# Patient Record
Sex: FEMALE | Race: BLACK OR AFRICAN AMERICAN | NOT HISPANIC OR LATINO | Employment: FULL TIME | URBAN - METROPOLITAN AREA
[De-identification: names, ages, dates, MRNs, and addresses within clinical notes are randomized per-mention and may not be internally consistent; named-entity substitution may affect disease eponyms.]

---

## 2021-05-30 ENCOUNTER — RECORDS - HEALTHEAST (OUTPATIENT)
Dept: ADMINISTRATIVE | Facility: CLINIC | Age: 51
End: 2021-05-30

## 2023-08-31 ENCOUNTER — HOSPITAL ENCOUNTER (EMERGENCY)
Facility: CLINIC | Age: 53
Discharge: HOME | End: 2023-08-31
Attending: EMERGENCY MEDICINE | Admitting: EMERGENCY MEDICINE
Payer: COMMERCIAL

## 2023-08-31 ENCOUNTER — APPOINTMENT (OUTPATIENT)
Dept: GENERAL RADIOLOGY | Facility: CLINIC | Age: 53
End: 2023-08-31
Attending: EMERGENCY MEDICINE
Payer: COMMERCIAL

## 2023-08-31 VITALS
OXYGEN SATURATION: 99 % | DIASTOLIC BLOOD PRESSURE: 84 MMHG | SYSTOLIC BLOOD PRESSURE: 122 MMHG | TEMPERATURE: 97.4 F | HEART RATE: 92 BPM | RESPIRATION RATE: 18 BRPM

## 2023-08-31 DIAGNOSIS — R07.9 CHEST PAIN, UNSPECIFIED TYPE: ICD-10-CM

## 2023-08-31 PROBLEM — R73.03 PREDIABETES: Status: ACTIVE | Noted: 2020-10-02

## 2023-08-31 PROBLEM — G89.29 CHRONIC PAIN OF RIGHT KNEE: Status: ACTIVE | Noted: 2022-03-09

## 2023-08-31 PROBLEM — M25.561 CHRONIC PAIN OF RIGHT KNEE: Status: ACTIVE | Noted: 2022-03-09

## 2023-08-31 PROBLEM — K57.90 DIVERTICULOSIS: Status: ACTIVE | Noted: 2020-03-13

## 2023-08-31 PROBLEM — M76.51 PATELLAR TENDONITIS OF RIGHT KNEE: Status: ACTIVE | Noted: 2022-03-29

## 2023-08-31 PROBLEM — R60.0 BILATERAL LEG EDEMA: Status: ACTIVE | Noted: 2020-09-15

## 2023-08-31 PROBLEM — N95.1 MENOPAUSAL SYNDROME (HOT FLASHES): Status: ACTIVE | Noted: 2022-03-09

## 2023-08-31 PROBLEM — M25.511 ACUTE PAIN OF RIGHT SHOULDER: Status: ACTIVE | Noted: 2020-09-15

## 2023-08-31 PROBLEM — R63.5 WEIGHT GAIN: Status: ACTIVE | Noted: 2022-03-09

## 2023-08-31 PROBLEM — K59.09 CHRONIC CONSTIPATION: Status: ACTIVE | Noted: 2020-03-13

## 2023-08-31 LAB
ALBUMIN SERPL BCG-MCNC: 4.4 G/DL (ref 3.5–5.2)
ALP SERPL-CCNC: 98 U/L (ref 35–104)
ALT SERPL W P-5'-P-CCNC: 25 U/L (ref 0–50)
ANION GAP SERPL CALCULATED.3IONS-SCNC: 12 MMOL/L (ref 7–15)
AST SERPL W P-5'-P-CCNC: 23 U/L (ref 0–45)
ATRIAL RATE - MUSE: 77 BPM
BASOPHILS # BLD AUTO: 0 10E3/UL (ref 0–0.2)
BASOPHILS NFR BLD AUTO: 1 %
BILIRUB SERPL-MCNC: 0.2 MG/DL
BUN SERPL-MCNC: 16.3 MG/DL (ref 6–20)
CALCIUM SERPL-MCNC: 9.8 MG/DL (ref 8.6–10)
CHLORIDE SERPL-SCNC: 105 MMOL/L (ref 98–107)
CREAT SERPL-MCNC: 0.65 MG/DL (ref 0.51–0.95)
D DIMER PPP FEU-MCNC: 0.29 UG/ML FEU (ref 0–0.5)
DEPRECATED HCO3 PLAS-SCNC: 24 MMOL/L (ref 22–29)
DIASTOLIC BLOOD PRESSURE - MUSE: NORMAL MMHG
EOSINOPHIL # BLD AUTO: 0.2 10E3/UL (ref 0–0.7)
EOSINOPHIL NFR BLD AUTO: 3 %
ERYTHROCYTE [DISTWIDTH] IN BLOOD BY AUTOMATED COUNT: 13.2 % (ref 10–15)
GFR SERPL CREATININE-BSD FRML MDRD: >90 ML/MIN/1.73M2
GLUCOSE SERPL-MCNC: 96 MG/DL (ref 70–99)
HCT VFR BLD AUTO: 42.8 % (ref 35–47)
HGB BLD-MCNC: 13.9 G/DL (ref 11.7–15.7)
HOLD SPECIMEN: NORMAL
IMM GRANULOCYTES # BLD: 0 10E3/UL
IMM GRANULOCYTES NFR BLD: 0 %
INTERPRETATION ECG - MUSE: NORMAL
LYMPHOCYTES # BLD AUTO: 3 10E3/UL (ref 0.8–5.3)
LYMPHOCYTES NFR BLD AUTO: 48 %
MCH RBC QN AUTO: 27.8 PG (ref 26.5–33)
MCHC RBC AUTO-ENTMCNC: 32.5 G/DL (ref 31.5–36.5)
MCV RBC AUTO: 86 FL (ref 78–100)
MONOCYTES # BLD AUTO: 0.6 10E3/UL (ref 0–1.3)
MONOCYTES NFR BLD AUTO: 9 %
NEUTROPHILS # BLD AUTO: 2.4 10E3/UL (ref 1.6–8.3)
NEUTROPHILS NFR BLD AUTO: 39 %
NRBC # BLD AUTO: 0 10E3/UL
NRBC BLD AUTO-RTO: 0 /100
P AXIS - MUSE: 37 DEGREES
PLATELET # BLD AUTO: 276 10E3/UL (ref 150–450)
POTASSIUM SERPL-SCNC: 3.8 MMOL/L (ref 3.4–5.3)
PR INTERVAL - MUSE: 130 MS
PROT SERPL-MCNC: 6.9 G/DL (ref 6.4–8.3)
QRS DURATION - MUSE: 78 MS
QT - MUSE: 368 MS
QTC - MUSE: 416 MS
R AXIS - MUSE: 45 DEGREES
RBC # BLD AUTO: 5 10E6/UL (ref 3.8–5.2)
SODIUM SERPL-SCNC: 141 MMOL/L (ref 136–145)
SYSTOLIC BLOOD PRESSURE - MUSE: NORMAL MMHG
T AXIS - MUSE: 53 DEGREES
TROPONIN T SERPL HS-MCNC: <6 NG/L
TROPONIN T SERPL HS-MCNC: <6 NG/L
VENTRICULAR RATE- MUSE: 77 BPM
WBC # BLD AUTO: 6.2 10E3/UL (ref 4–11)

## 2023-08-31 PROCEDURE — 85025 COMPLETE CBC W/AUTO DIFF WBC: CPT | Performed by: EMERGENCY MEDICINE

## 2023-08-31 PROCEDURE — 250N000013 HC RX MED GY IP 250 OP 250 PS 637: Performed by: EMERGENCY MEDICINE

## 2023-08-31 PROCEDURE — 71046 X-RAY EXAM CHEST 2 VIEWS: CPT | Mod: 26 | Performed by: RADIOLOGY

## 2023-08-31 PROCEDURE — 93005 ELECTROCARDIOGRAM TRACING: CPT | Performed by: EMERGENCY MEDICINE

## 2023-08-31 PROCEDURE — 99285 EMERGENCY DEPT VISIT HI MDM: CPT | Mod: 25 | Performed by: EMERGENCY MEDICINE

## 2023-08-31 PROCEDURE — 36415 COLL VENOUS BLD VENIPUNCTURE: CPT | Performed by: EMERGENCY MEDICINE

## 2023-08-31 PROCEDURE — 84484 ASSAY OF TROPONIN QUANT: CPT | Performed by: EMERGENCY MEDICINE

## 2023-08-31 PROCEDURE — 80053 COMPREHEN METABOLIC PANEL: CPT | Performed by: EMERGENCY MEDICINE

## 2023-08-31 PROCEDURE — 93010 ELECTROCARDIOGRAM REPORT: CPT | Performed by: EMERGENCY MEDICINE

## 2023-08-31 PROCEDURE — 85379 FIBRIN DEGRADATION QUANT: CPT | Performed by: EMERGENCY MEDICINE

## 2023-08-31 PROCEDURE — 71046 X-RAY EXAM CHEST 2 VIEWS: CPT

## 2023-08-31 RX ORDER — MULTIPLE VITAMINS W/ MINERALS TAB 9MG-400MCG
1 TAB ORAL DAILY
COMMUNITY

## 2023-08-31 RX ORDER — MAGNESIUM HYDROXIDE/ALUMINUM HYDROXICE/SIMETHICONE 120; 1200; 1200 MG/30ML; MG/30ML; MG/30ML
15 SUSPENSION ORAL ONCE
Status: COMPLETED | OUTPATIENT
Start: 2023-08-31 | End: 2023-08-31

## 2023-08-31 RX ADMIN — ALUMINUM HYDROXIDE, MAGNESIUM HYDROXIDE, AND SIMETHICONE 15 ML: 200; 200; 20 SUSPENSION ORAL at 18:29

## 2023-08-31 ASSESSMENT — ACTIVITIES OF DAILY LIVING (ADL)
ADLS_ACUITY_SCORE: 35

## 2023-08-31 NOTE — ED TRIAGE NOTES
Patient ambulatory to ED for intermittent  right chest pain, started  at 4: 15 PM while sitting. Patient report she has had crackers and dry fruit an hour.  Denied NV.  SOB while ambulating.  Intermittent Dull sensation to right hand started at 4: 15 PM. No significant history.     Triage Assessment       Row Name 08/31/23 1800       Triage Assessment (Adult)    Airway WDL WDL       Respiratory WDL    Respiratory WDL WDL;X        Skin Circulation/Temperature WDL    Skin Circulation/Temperature WDL WDL       Cardiac WDL    Cardiac WDL X   chest pain       Peripheral/Neurovascular WDL    Peripheral Neurovascular WDL X   right hand dull sensation       Cognitive/Neuro/Behavioral WDL    Cognitive/Neuro/Behavioral WDL WDL

## 2023-08-31 NOTE — ED PROVIDER NOTES
[facility] ([facility])    8/31/23       History     Chief Complaint   Patient presents with    Chest Pain     The history is provided by the patient and medical records.     Karoline Esparza is a 53 year old female who with PMH of diverticulosis, iron deficiency anemia, and allergic rhinitis who presents to the ED with chest pain. Patient reports that she has been having intermittent chest pain on her right side of chest since 4:15 PM today. She was on her desk working and by 5 PM she got up to leave to go home. She also reports that on deep breaths that her chest hurts and feels short of breath when she is walking.  Patient has had similar symptoms in the past on her left side about 10 to 15 years ago.   Also, patient reports that she has had acid reflux history.  She is no longer taking any antacid medication. Her symptoms started up again about 2 weeks ago. Patient reports that she has a history of being a prediabetic and has a family history of heart disease.  Also, her mother may have had a clotting disorder. She passed away due to cardiac disease at 73 years old. No other family members have known CAD. Denies fever or cough.  Denies nausea or vomiting.  Denies being on any estrogen.  Denies history of hypertension diabetes mellitus or smoking.     Past Medical History  No past medical history on file.  No past surgical history on file.  azelastine (ASTEPRO) 0.15 % (205.5 mcg) Spry nasal spray  multivitamin w/minerals (THERA-VIT-M) tablet  omeprazole (PRILOSEC) 20 MG capsule      Allergies   Allergen Reactions    Seasonal Allergies Itching     Family History  No family history on file.  Social History       Past medical history, past surgical history, medications, allergies, family history, and social history were reviewed with the patient. No additional pertinent items.      A complete review of systems was performed with pertinent positives and negatives noted in the HPI, and all  other systems negative.    Physical Exam   BP: 122/84  Pulse: 92  Temp: 97.4  F (36.3  C)  Resp: 18  SpO2: 99 %  Physical Exam  Vitals and nursing note reviewed.   Constitutional:       General: She is not in acute distress.     Appearance: Normal appearance. She is well-developed. She is not ill-appearing or diaphoretic.   HENT:      Head: Normocephalic and atraumatic.      Nose: Nose normal.      Mouth/Throat:      Mouth: Mucous membranes are moist.   Eyes:      General: No scleral icterus.     Conjunctiva/sclera: Conjunctivae normal.   Cardiovascular:      Rate and Rhythm: Normal rate.   Pulmonary:      Effort: Pulmonary effort is normal. No respiratory distress.      Breath sounds: No stridor.   Abdominal:      General: There is no distension.      Palpations: Abdomen is soft.   Musculoskeletal:         General: No deformity or signs of injury. Normal range of motion.      Cervical back: Normal range of motion and neck supple. No rigidity.      Right lower leg: No edema.      Left lower leg: No edema.   Skin:     General: Skin is warm and dry.      Coloration: Skin is not jaundiced or pale.   Neurological:      General: No focal deficit present.      Mental Status: She is alert and oriented to person, place, and time.   Psychiatric:         Mood and Affect: Mood normal.         Behavior: Behavior normal.           ED Course, Procedures, & Data    6:15 PM  The patient was seen and examined by Marisela Field MD. In waiting room.   Procedures            EKG Interpretation:      Interpreted by Marisela Field MD  Time reviewed: 1806  Symptoms at time of EKG: chest pain   Rhythm: normal sinus   Rate: normal  Axis: normal  Ectopy: none  Conduction: normal  ST Segments/ T Waves: No ST-T wave changes  Q Waves: none    Clinical Impression: normal EKG           Results for orders placed or performed during the hospital encounter of 08/31/23   XR Chest 2 Views     Status: None    Narrative    EXAM: XR CHEST 2 VIEWS  8/31/2023 6:28 PM    HISTORY: chest pain, sob.    COMPARISON: none.    TECHNIQUE: Upright frontal and lateral views of the chest.    FINDINGS: Trachea is midline. Cardiac and mediastinal silhouette is  within normal limits. No focal pulmonary opacities. No pleural  effusions. No pneumothoraces.  No acute osseous abnormalities.      Impression    IMPRESSION: Clear chest.    I have personally reviewed the examination and initial interpretation  and I agree with the findings.    NEO GILLESPIE MD         SYSTEM ID:  [id number]   Troponin T, High Sensitivity     Status: Normal   Result Value Ref Range    Troponin T, High Sensitivity <6 <=14 ng/L   Comprehensive metabolic panel     Status: Normal   Result Value Ref Range    Sodium 141 136 - 145 mmol/L    Potassium 3.8 3.4 - 5.3 mmol/L    Chloride 105 98 - 107 mmol/L    Carbon Dioxide (CO2) 24 22 - 29 mmol/L    Anion Gap 12 7 - 15 mmol/L    Urea Nitrogen 16.3 6.0 - 20.0 mg/dL    Creatinine 0.65 0.51 - 0.95 mg/dL    Calcium 9.8 8.6 - 10.0 mg/dL    Glucose 96 70 - 99 mg/dL    Alkaline Phosphatase 98 35 - 104 U/L    AST 23 0 - 45 U/L    ALT 25 0 - 50 U/L    Protein Total 6.9 6.4 - 8.3 g/dL    Albumin 4.4 3.5 - 5.2 g/dL    Bilirubin Total 0.2 <=1.2 mg/dL    GFR Estimate >90 >60 mL/min/1.73m2   CBC with platelets and differential     Status: None   Result Value Ref Range    WBC Count 6.2 4.0 - 11.0 10e3/uL    RBC Count 5.00 3.80 - 5.20 10e6/uL    Hemoglobin 13.9 11.7 - 15.7 g/dL    Hematocrit 42.8 35.0 - 47.0 %    MCV 86 78 - 100 fL    MCH 27.8 26.5 - 33.0 pg    MCHC 32.5 31.5 - 36.5 g/dL    RDW 13.2 10.0 - 15.0 %    Platelet Count 276 150 - 450 10e3/uL    % Neutrophils 39 %    % Lymphocytes 48 %    % Monocytes 9 %    % Eosinophils 3 %    % Basophils 1 %    % Immature Granulocytes 0 %    NRBCs per 100 WBC 0 <1 /100    Absolute Neutrophils 2.4 1.6 - 8.3 10e3/uL    Absolute Lymphocytes 3.0 0.8 - 5.3 10e3/uL    Absolute Monocytes 0.6 0.0 - 1.3 10e3/uL    Absolute Eosinophils 0.2  0.0 - 0.7 10e3/uL    Absolute Basophils 0.0 0.0 - 0.2 10e3/uL    Absolute Immature Granulocytes 0.0 <=0.4 10e3/uL    Absolute NRBCs 0.0 10e3/uL   D dimer quantitative     Status: Normal   Result Value Ref Range    D-Dimer Quantitative 0.29 0.00 - 0.50 ug/mL FEU    Narrative    This D-dimer assay is intended for use in conjunction with a clinical pretest probability assessment model to exclude pulmonary embolism (PE) and deep venous thrombosis (DVT) in outpatients suspected of PE or DVT. The cut-off value is 0.50 ug/mL FEU.   [place] Draw     Status: None    Narrative    The following orders were created for panel order [place] Draw.  Procedure                               Abnormality         Status                     ---------                               -----------         ------                     Extra Red Top Tube[[id number]]                               Final result                 Please view results for these tests on the individual orders.   Extra Red Top Tube     Status: None   Result Value Ref Range    Hold Specimen JIC    Troponin T, High Sensitivity     Status: Normal   Result Value Ref Range    Troponin T, High Sensitivity <6 <=14 ng/L   EKG 12 lead     Status: None   Result Value Ref Range    Systolic Blood Pressure  mmHg    Diastolic Blood Pressure  mmHg    Ventricular Rate 77 BPM    Atrial Rate 77 BPM    TN Interval 130 ms    QRS Duration 78 ms     ms    QTc 416 ms    P Axis 37 degrees    R AXIS 45 degrees    T Axis 53 degrees    Interpretation ECG Sinus rhythm  Normal ECG      CBC with platelets differential     Status: None    Narrative    The following orders were created for panel order CBC with platelets differential.  Procedure                               Abnormality         Status                     ---------                               -----------         ------                     CBC with platelets and d...[[id number]]                      Final result                 Please  view results for these tests on the individual orders.     Medications   alum & mag hydroxide-simethicone (MAALOX) suspension 15 mL (15 mLs Oral $Given 8/31/23 8537)     Labs Ordered and Resulted from Time of ED Arrival to Time of ED Departure   TROPONIN T, HIGH SENSITIVITY - Normal       Result Value    Troponin T, High Sensitivity <6     COMPREHENSIVE METABOLIC PANEL - Normal    Sodium 141      Potassium 3.8      Chloride 105      Carbon Dioxide (CO2) 24      Anion Gap 12      Urea Nitrogen 16.3      Creatinine 0.65      Calcium 9.8      Glucose 96      Alkaline Phosphatase 98      AST 23      ALT 25      Protein Total 6.9      Albumin 4.4      Bilirubin Total 0.2      GFR Estimate >90     D DIMER QUANTITATIVE - Normal    D-Dimer Quantitative 0.29     TROPONIN T, HIGH SENSITIVITY - Normal    Troponin T, High Sensitivity <6     CBC WITH PLATELETS AND DIFFERENTIAL    WBC Count 6.2      RBC Count 5.00      Hemoglobin 13.9      Hematocrit 42.8      MCV 86      MCH 27.8      MCHC 32.5      RDW 13.2      Platelet Count 276      % Neutrophils 39      % Lymphocytes 48      % Monocytes 9      % Eosinophils 3      % Basophils 1      % Immature Granulocytes 0      NRBCs per 100 WBC 0      Absolute Neutrophils 2.4      Absolute Lymphocytes 3.0      Absolute Monocytes 0.6      Absolute Eosinophils 0.2      Absolute Basophils 0.0      Absolute Immature Granulocytes 0.0      Absolute NRBCs 0.0       XR Chest 2 Views   Final Result   IMPRESSION: Clear chest.      I have personally reviewed the examination and initial interpretation   and I agree with the findings.      NEO GILLESPIE MD            SYSTEM ID:  [id number]             Critical care was not performed.     Medical Decision Making  The patient's presentation was of high complexity (an acute health issue posing potential threat to life or bodily function).    The patient's evaluation involved:  ordering and/or review of 3+ test(s) in this encounter (see separate area of  note for details)    The patient's management necessitated moderate risk (prescription drug management including medications given in the ED) and high risk (a decision regarding hospitalization).    Assessment & Plan    Karoline Esparza is a 53 year old female who with PMH of diverticulosis, iron deficiency anemia, and allergic rhinitis who presents to the ED with chest pain.     Ddx: ACUTE CORONARY SYNDROME, GERD/gastritis, PE, PNA, PTX    Well appearing with normal vitals. Dimer neg. Trop neg. CBC and CMP nl. EKG nl. CXR clear. Given Gi cocktail. Onset of pain occurred less than 6 hours PTA. Repeat Trop ordered for 10:15 PM which is 6 hours from onset of pain. This was again negative. Encouraged outpatient follow up with PCP for further cardiac work up, as indicated. Also advised patient to resume her antacid medication. Return precautions provided.         I have reviewed the nursing notes. I have reviewed the findings, diagnosis, plan and need for follow up with the patient.    New Prescriptions    No medications on file       Final diagnoses:   Chest pain, unspecified type   I, IRINA BHAGAT, am serving as a trained medical scribe to document services personally performed by Marisela Field MD, based on the provider's statements to me.     I, Marisela Field MD, was physically present and have reviewed and verified the accuracy of this note documented by IRINA BHAGAT.     Marisela Field MD.  [facility] EMERGENCY DEPARTMENT  8/31/2023     Marisela Field MD  08/31/23 [id number]

## 2023-09-01 NOTE — DISCHARGE INSTRUCTIONS
Please make an appointment to follow up with Your Primary Care Provider in 3-5 days if not improving.      Return to the Emergency Department if you develop worsening chest pain, shortness of breath, fever, coughing up blood, or fainting.

## 2023-12-24 ENCOUNTER — HEALTH MAINTENANCE LETTER (OUTPATIENT)
Age: 53
End: 2023-12-24

## 2025-01-18 ENCOUNTER — HEALTH MAINTENANCE LETTER (OUTPATIENT)
Age: 55
End: 2025-01-18

## 2025-07-20 ENCOUNTER — HEALTH MAINTENANCE LETTER (OUTPATIENT)
Age: 55
End: 2025-07-20